# Patient Record
Sex: FEMALE | Race: WHITE | Employment: FULL TIME | URBAN - METROPOLITAN AREA
[De-identification: names, ages, dates, MRNs, and addresses within clinical notes are randomized per-mention and may not be internally consistent; named-entity substitution may affect disease eponyms.]

---

## 2018-09-19 ENCOUNTER — OP HISTORICAL/CONVERTED ENCOUNTER (OUTPATIENT)
Dept: OTHER | Age: 48
End: 2018-09-19

## 2021-05-26 ENCOUNTER — HOSPITAL ENCOUNTER (OUTPATIENT)
Dept: PHYSICAL THERAPY | Age: 51
Discharge: HOME | End: 2021-05-26
Payer: COMMERCIAL

## 2021-05-26 PROCEDURE — 97162 PT EVAL MOD COMPLEX 30 MIN: CPT

## 2021-05-26 PROCEDURE — 97110 THERAPEUTIC EXERCISES: CPT

## 2021-05-26 NOTE — PROGRESS NOTES
[address] [address] [facility] [address] [facility], [address] [place] [address]  Ph: [phone]    Fax: [phone]    Initial Evaluation/Plan of Care/Statement of Necessity for Physical Therapy Services     Patient name: Aysha Meza   : 1970  [x]  Patient  Verified Provider#: [id number]    Start of Care: 21       Onset Date: 21  Referral source: LAMBERT Linder Return visit to MD: 6 weeks     Medical/Treatment Diagnosis: Low back pain [M54.5]  Radiculopathy, lumbar region [M54.16]    Payor: Elida Gone / Plan: [employer] HMO/CHOICE PLUS/POS / Product Type: HMO /       Prior Hospitalization: see medical history     Comorbidities: asthma  Prior Level of Function: independent  Medications: Verified on Patient Summary List          Patient / Family readiness to learn indicated by: asking questions and trying to perform skills  Persons(s) to be included in education: patient (P)  Barriers to Learning/Limitations: None  Patient Self Reported Health Status: good  Rehabilitation Potential: good    In time:0230pm   Out time:0330pm Total Treatment Time (min): 60   Total Timed Codes (min): 25 1:1 Treatment Time ( only): 25   Visit #: 1     SUBJECTIVE  Pt states she has \"spondylo\". Reports history of chronic back pain and pathology since herniating two discs in early 2018. She had PT after this which seemed to help. However, after this initial injury, she has continued to have cycles of pain. Has had three other episodes of severe pain with radicular symptoms since initial injury in 2018 including one last week. Pain has never completely went away between these episodes but gets better. Pain radiates into the L hip, gluteal muscles, into thigh and foot. Pt states she also has a new bone spur on the L foot (calcaneus) but was told it was not big enough to do anything for. Also reports new onset abnormal sensation in the L 2nd toe, dorsal surface.    Pt states she is moving and has been doing a lot of work around the house which could contribute to her recent pain. Pt expressing concern because she can tell she is getting weaker and is very nervous about her back. Difficulty exercising due to fear of hurting her back further. Previous Treatment/Compliance: PT in 2018, has not been consistent about continuing HEP  PMHx/Surgical Hx: no surgeries/injections; MRI in 2018 and x-rays taken yesterday showing increased vertebral narrowing. Work Hx: sits in a chair most of the day  Living Situation: moving to new home, lives with spouse  Barriers to progress: chronicity  Substance use: no  Cognition: A & O x 4     PAIN:  Area of pain: L lower back and L leg   Pain Level (0-10 scale): 2-10/10   Things that worsen pain: standing, bending, and turning   Things that ease pain: nothing   *Pt is on a steroid pack for one week, then switches to a non-steroid for one more week. Pain described as a burning pain, sharp in the L hip and with certain movements.     Pain Level (0-10 scale) pre treatment: 2/10 Pain Level (0-10 scale) post treatment: 2/10    OBJECTIVE    25 min Therapeutic Exercise:  [x] See flow sheet : Reviewed HEP   Rationale: increase ROM and increase strength to improve the patients ability to perform ADLs/exercise with decrease pain           With   [x] TE   [] TA   [] neuro   [] other: Patient Education: [x] Review HEP    [] Progressed/Changed HEP based on:   [x] positioning   [] body mechanics   [] transfers   [] heat/ice application    [x] other: instructed in use of rolling pin and tennis ball for myofascial release of ITB and piriformis muscle; instructed in trial of prone positioning when painful     Objective/Functional Measures:   Physical Findings   Ortho:   Posture: LLE appears longer in supine, equal in long sit  Gait and Functional Mobility:  WNL no significant antalgic pattern, prefers use of UE to assist in sit to stand transfer  Palpation: increased tightness and trigger points noted in L piriformis, L ITB, L gluteals, hal lumbar paraspinals R>L with tenderness to palpation  Spine:   TRUNK ROM:   Flexion:                         [] N/A  []WNL  [x]Minimal  []Moderate  [] Major pain  Extension:                     [] N/A  []WNL  [x]Minimal  []Moderate  [] Major    Right Lateral Flexion:    [] N/A  []WNL  [x]Minimal  []Moderate  [] Major    Left Lateral Flexion:   [] N/A  []WNL  [x]Minimal  []Moderate  [] Major   Rotation to the Right:  [] N/A  []WNL  [x]Minimal  []Moderate  [] Major   Rotation to the Left:   [] N/A  []WNL  [x]Minimal  []Moderate  [] Major   Additional comments: minimal ROM deficits but painful at end range; pt states she used to be very flexible   Specific joints: *normal values in ()     Hip      AROM       PROM             MMT   R L R L R L   Flexion (120)     4 4   Extension (15)     4 4-   Abduction (40)     4 4-   Adduction (30)         IR (40)         ER (40)         Additional comments: ROM WNL Hal  Knee and ANKLE strength/ROM WNL      Mobility Assessment: PA mobs not assessed     Flexibility: Hamstring tightness 90/90: R- 11 deg from full; L- 25 deg; moderate L calf tightness      Neurological: Reflexes / Sensations: abnormal sensation L 2nd toe plantar surface, reports hypersensitivity and tingling  Special Tests: slump (+L); SLR (+L)     ASSESSMENT/Changes in Function:   Pt is a 45 yr old female presenting to outpatient PT services with diagnosis of lumbar radiculopathy. Signs and symptoms consistent with diagnosis. Pt also reporting L lateral hip pain and TTP/tightness through IT band and TTP over greater trochanter. Pt is very tight with reproduction of symptoms with palpation of L piriformis. Note core instability, L hip weakness, and muscle tightness through the LLE and B paraspinals. Pt reports fear avoidance behaviors and states she is discouraged because she feels she cannot exercise due to risk of back injury.  Pt will benefit from skilled PT services to address impairments and allow return to max level of function. Problem List/Impairments: pain affecting function, decrease ROM, decrease strength, decrease ADL/ functional abilitiies, decrease activity tolerance and decrease flexibility/ joint mobility  Patient Goal (s): ease pain and learn helpful exercises  Short Term Goals: To be accomplished in 6 treatments:  1. Pt will report compliance to a progressive HEP. 2. Pt will report decrease in max pain per VAS to 5/10. Long Term Goals: To be accomplished in 16 treatments:  1. Pt will demo full trunk ROM without pain. 2. Pt will report ability to stand to perform ADLs without aggravating back pain. 3. Pt will demo appropriate body mechanics with squatting and lifting. 4. Pt will report confidence in starting a regular exercise program.  Frequency / Duration: Patient to be seen 2 times per week for 16 treatments. Certification Period: 5/26/21 - 8/24/21  Treatment Plan may include any combination of the following: Therapeutic exercise, Therapeutic activities, Neuromuscular re-education, Physical agent/modality, Manual therapy, Patient education, Self Care training and Functional mobility training    Patient/ Caregiver education and instruction: self care and exercises    [x]  Plan of care has been reviewed with PTA. The Plan of Care is based on information from the initial evaluation. Enzo Falk, PT, DPT 5/26/2021     ________________________________________________________________________    I certify that the above Therapy Services are being furnished while the patient is under my care. I agree with the treatment plan and certify that this therapy is necessary.     [de-identified] Signature:____________________  Date:____________Time: _________

## 2021-05-28 ENCOUNTER — APPOINTMENT (OUTPATIENT)
Dept: PHYSICAL THERAPY | Age: 51
End: 2021-05-28
Payer: COMMERCIAL

## 2021-06-02 ENCOUNTER — HOSPITAL ENCOUNTER (OUTPATIENT)
Dept: PHYSICAL THERAPY | Age: 51
Discharge: HOME | End: 2021-06-02
Payer: COMMERCIAL

## 2021-06-02 PROCEDURE — 97140 MANUAL THERAPY 1/> REGIONS: CPT

## 2021-06-02 PROCEDURE — 97014 ELECTRIC STIMULATION THERAPY: CPT

## 2021-06-02 PROCEDURE — 97110 THERAPEUTIC EXERCISES: CPT

## 2021-06-02 NOTE — PROGRESS NOTES
PT DAILY TREATMENT NOTE  NON [facility]     Patient Name: Charles Lorenzo  Date:2021  : 1970  [x]  Patient  Verified  Payor: Hay Denis / Plan: [facility] HMO/CHOICE PLUS/POS / Product Type: HMO /    Treatment Area: Low back pain [M54.5]  Radiculopathy, lumbar region [M54.16]       Next MD APPT:  In time:1:10pm  Out time:2:15pm  Total Treatment Time (min): 65  Visit #: 2    SUBJECTIVE  Pain Level (0-10 scale) pre treatment: 4     Pain Level (0-10 scale) post treatment: 2  Any medication changes, allergies to medications, adverse drug reactions, diagnosis change, or new procedure performed?:   [x] No    [] Yes (see summary sheet for update)  Subjective functional status/changes:   [x] No changes reported  Pt states her L heel is hurting the most right now. Pt reports no change in back pain. A lot of pain from back into the L LE with hamstring stretch using the strap.     OBJECTIVE    Modality rationale: decrease inflammation, decrease pain and increase tissue extensibility to improve the patients ability to move around without back/L leg pain    Min Type Additional Details   15 [] Estim: [x]UnAtt   []Att       []TENS instruct                  [x]IFC  []Premod   []NMES                     []Other:  []w/US   []w/ice   [x]w/heat  Position: prone   Location: lumbosacral region     []  Ice     []  Heat  []  Ice massage Position:  Location:    []  Traction: [] Cervical       []Lumbar                       [] Prone          []Supine                       []Intermittent   []Continuous Lbs:  []w/heat  []W/heat and Estim    []  Ultrasound: []Continuous   [] Pulsed at:                           []1MHz   []3MHz Location:  W/cm2:      [x] Skin assessment post-treatment:   [x]intact  []redness- no adverse reaction   []redness  adverse reaction:   35 min Therapeutic Exercise:  [x] See flow sheet :   Rationale: increase ROM and increase strength to improve the patients ability to move around without pain   10 min Manual Therapy: Trigger point release L gluteal region    Rationale: decrease pain, increase ROM, increase tissue extensibility and decrease trigger points to improve the patients ability to move around without LBP     With   [x] TE   [] TA   [] Neuro   [] SC   [] other: Patient Education: [x] Review HEP    [] Progressed/Changed HEP based on:   [] positioning   [] body mechanics   [] transfers   [] Use of heat/ice     [] other:         Other Objective/Functional Measures: modified hamstring stretching to seated position and added towel calf stress. Time spent also educating pt on pain management tips for back and foot pains. ASSESSMENT/Changes in Function:   Pt responded well to tx with reduction in pain post session. Trigger point noted in glut med and quadratus femoris. Pt also with plantar fascia and gastroc tightness on L which is likely contributing to pains throughout the L side. Will continue to work on flexibility and core stabilization as tolerated. Patient will continue to benefit from skilled PT services to modify and progress therapeutic interventions, address ROM deficits, address strength deficits, analyze and address soft tissue restrictions, analyze and cue movement patterns and analyze and modify body mechanics/ergonomics to attain remaining goals. GOALS/Progress towards goals:  Patient Goal (s): ease pain and learn helpful exercises  Short Term Goals: To be accomplished in 6 treatments:  1. Pt will report compliance to a progressive HEP. 2. Pt will report decrease in max pain per VAS to 5/10. Long Term Goals: To be accomplished in 16 treatments:  1. Pt will demo full trunk ROM without pain. 2. Pt will report ability to stand to perform ADLs without aggravating back pain. 3. Pt will demo appropriate body mechanics with squatting and lifting.   4. Pt will report confidence in starting a regular exercise program.  PLAN  [x]  Upgrade activities as tolerated [x]  Continue plan of care  [x]  Update interventions per flow sheet       []  Discharge due to:_  []  Other:_      Helio Kirby, PT, DPT 6/2/2021

## 2021-06-04 ENCOUNTER — HOSPITAL ENCOUNTER (OUTPATIENT)
Dept: PHYSICAL THERAPY | Age: 51
Discharge: HOME | End: 2021-06-04
Payer: COMMERCIAL

## 2021-06-04 PROCEDURE — 97110 THERAPEUTIC EXERCISES: CPT

## 2021-06-04 PROCEDURE — 97014 ELECTRIC STIMULATION THERAPY: CPT

## 2021-06-04 PROCEDURE — 97140 MANUAL THERAPY 1/> REGIONS: CPT

## 2021-06-04 NOTE — PROGRESS NOTES
PT DAILY TREATMENT NOTE  [facility]     Patient Name: Gregg Barker  Date:2021  : 1970  [x]  Patient  Verified  Payor: Mary Fonseca / Plan: [facility] HMO/CHOICE PLUS/POS / Product Type: HMO /    Treatment Area: Low back pain [M54.5]  Radiculopathy, lumbar region [M54.16]       Next MD APPT:  In time:9:05am  Out time:10:05am  Total Treatment Time (min): 60  Visit #: 3    SUBJECTIVE  Pain Level (0-10 scale) pre treatment: 2     Pain Level (0-10 scale) post treatment: 1  Any medication changes, allergies to medications, adverse drug reactions, diagnosis change, or new procedure performed?:   [x] No    [] Yes (see summary sheet for update)  Subjective functional status/changes:   [x] No changes reported  Pt reports minimal LBP currently. Chief complaint L heel pain. Pt requesting for referral for the L heel. Pt states she thinks the last tx helped with the back pain.       OBJECTIVE    Modality rationale: decrease inflammation, decrease pain and increase tissue extensibility to improve the patients ability to move around without back/L leg pain    Min Type Additional Details   15 [] Estim: [x]UnAtt   []Att       []TENS instruct                  [x]IFC  []Premod   []NMES                     []Other:  []w/US   []w/ice   [x]w/heat  Position: prone   Location: lumbosacral region     []  Ice     []  Heat  []  Ice massage Position:  Location:    []  Traction: [] Cervical       []Lumbar                       [] Prone          []Supine                       []Intermittent   []Continuous Lbs:  []w/heat  []W/heat and Estim    []  Ultrasound: []Continuous   [] Pulsed at:                           []1MHz   []3MHz Location:  W/cm2:      [x] Skin assessment post-treatment:   [x]intact  []redness- no adverse reaction   []redness  adverse reaction:   35 min Therapeutic Exercise:  [x] See flow sheet :   Rationale: increase ROM and increase strength to improve the patients ability to move around without pain   10 min Manual Therapy: Trigger point release L gluteal region    Rationale: decrease pain, increase ROM, increase tissue extensibility and decrease trigger points to improve the patients ability to move around without LBP     With   [x] TE   [] TA   [] Neuro   [] SC   [] other: Patient Education: [x] Review HEP    [] Progressed/Changed HEP based on:   [] positioning   [] body mechanics   [] transfers   [] Use of heat/ice     [] other:         Other Objective/Functional Measures: continued previous exercises - initiated abdominal bracing. ASSESSMENT/Changes in Function:   Pt with weakness abdominal muscles due to h.o. small bowel resection. Pt also reporting pelvic floor weakness. Will continue to progress core stabilization as tolerated. Noted trigger points along the quadratus femoris and IT band. Will request referral for L heel pain as this is her chief complaint as this time. Patient will continue to benefit from skilled PT services to modify and progress therapeutic interventions, address ROM deficits, address strength deficits, analyze and address soft tissue restrictions, analyze and cue movement patterns and analyze and modify body mechanics/ergonomics to attain remaining goals. GOALS/Progress towards goals:  Patient Goal (s): ease pain and learn helpful exercises  Short Term Goals: To be accomplished in 6 treatments:  1. Pt will report compliance to a progressive HEP. 2. Pt will report decrease in max pain per VAS to 5/10. Long Term Goals: To be accomplished in 16 treatments:  1. Pt will demo full trunk ROM without pain. 2. Pt will report ability to stand to perform ADLs without aggravating back pain. 3. Pt will demo appropriate body mechanics with squatting and lifting.   4. Pt will report confidence in starting a regular exercise program.  PLAN  [x]  Upgrade activities as tolerated     [x]  Continue plan of care  [x]  Update interventions per flow sheet       [] Discharge due to:_  [x]  Other:_  Request sent for FLACA Lay PT, DPT 6/4/2021

## 2021-06-07 ENCOUNTER — HOSPITAL ENCOUNTER (OUTPATIENT)
Dept: PHYSICAL THERAPY | Age: 51
Discharge: HOME | End: 2021-06-07
Payer: COMMERCIAL

## 2021-06-07 PROCEDURE — 97110 THERAPEUTIC EXERCISES: CPT

## 2021-06-07 PROCEDURE — 97014 ELECTRIC STIMULATION THERAPY: CPT

## 2021-06-07 NOTE — PROGRESS NOTES
PT DAILY TREATMENT NOTE  [facility]     Patient Name: Leonela Deluca  Date:2021  : 1970  [x]  Patient  Verified  Payor: Zeus Day / Plan: [facility] HMO/CHOICE PLUS/POS / Product Type: HMO /    Treatment Area: Low back pain [M54.5]  Radiculopathy, lumbar region [M54.16]       Next MD APPT:  In time:9:10am  Out time: 10:05am  Total Treatment Time (min): 55  Visit #:4    SUBJECTIVE  Pain Level (0-10 scale) pre treatment: 0     Pain Level (0-10 scale) post treatment: 0  Any medication changes, allergies to medications, adverse drug reactions, diagnosis change, or new procedure performed?:   [x] No    [] Yes (see summary sheet for update)  Subjective functional status/changes:   [x] No changes reported  Pt reports she was swimming all weekend and is feeling pretty good this morning. No pain in the back or heel this morning.       OBJECTIVE    Modality rationale: decrease inflammation, decrease pain and increase tissue extensibility to improve the patients ability to move around without back/L leg pain    Min Type Additional Details   15 [] Estim: [x]UnAtt   []Att       []TENS instruct                  [x]IFC  []Premod   []NMES                     []Other:  []w/US   []w/ice   [x]w/heat  Position: R sidelying   Location: L gluteal     []  Ice     []  Heat  []  Ice massage Position:  Location:    []  Traction: [] Cervical       []Lumbar                       [] Prone          []Supine                       []Intermittent   []Continuous Lbs:  []w/heat  []W/heat and Estim    []  Ultrasound: []Continuous   [] Pulsed at:                           []1MHz   []3MHz Location:  W/cm2:      [x] Skin assessment post-treatment:   [x]intact  []redness- no adverse reaction   []redness  adverse reaction:   40 min Therapeutic Exercise:  [x] See flow sheet :   Rationale: increase ROM and increase strength to improve the patients ability to move around without pain       With   [x] TE   [] TA   [] Neuro   [] SC   [] other: Patient Education: [x] Review HEP    [] Progressed/Changed HEP based on:   [] positioning   [] body mechanics   [] transfers   [] Use of heat/ice     [] other:         Other Objective/Functional Measures: Progressed core stabilization in hooklying position     ASSESSMENT/Changes in Function:   No pain pre or post tx this session. Pt is progressing well at this time. Responded well to swimming over the weekend. Still noting some trigger points along the L IT band and gluteal region at this time. Will continue to progress as tolerated. .    Patient will continue to benefit from skilled PT services to modify and progress therapeutic interventions, address ROM deficits, address strength deficits, analyze and address soft tissue restrictions, analyze and cue movement patterns and analyze and modify body mechanics/ergonomics to attain remaining goals. GOALS/Progress towards goals:  Patient Goal (s): ease pain and learn helpful exercises  Short Term Goals: To be accomplished in 6 treatments:  1. Pt will report compliance to a progressive HEP. 2. Pt will report decrease in max pain per VAS to 5/10. Long Term Goals: To be accomplished in 16 treatments:  1. Pt will demo full trunk ROM without pain. 2. Pt will report ability to stand to perform ADLs without aggravating back pain. 3. Pt will demo appropriate body mechanics with squatting and lifting.   4. Pt will report confidence in starting a regular exercise program.  PLAN  [x]  Upgrade activities as tolerated     [x]  Continue plan of care  [x]  Update interventions per flow sheet       []  Discharge due to:_  [x]  Other:_  Request sent for FLACA Morales, PT, DPT 6/7/2021

## 2021-06-11 ENCOUNTER — APPOINTMENT (OUTPATIENT)
Dept: PHYSICAL THERAPY | Age: 51
End: 2021-06-11
Payer: COMMERCIAL

## 2021-06-14 ENCOUNTER — HOSPITAL ENCOUNTER (OUTPATIENT)
Dept: PHYSICAL THERAPY | Age: 51
Discharge: HOME | End: 2021-06-14
Payer: COMMERCIAL

## 2021-06-14 PROCEDURE — 97014 ELECTRIC STIMULATION THERAPY: CPT

## 2021-06-14 PROCEDURE — 97110 THERAPEUTIC EXERCISES: CPT

## 2021-06-14 NOTE — PROGRESS NOTES
PT DAILY TREATMENT NOTE  [facility]     Patient Name: Arun Purchase  Date:2021  : 1970  [x]  Patient  Verified  Payor: Michael Ernandez / Plan: [facility] HMO/CHOICE PLUS/POS / Product Type: HMO /    Treatment Area: Low back pain [M54.5]  Radiculopathy, lumbar region [M54.16]       Next MD APPT:  In time:1:08pm  Out time: 2:03pm  Total Treatment Time (min): 55  Visit #:5    SUBJECTIVE  Pain Level (0-10 scale) pre treatment: 4     Pain Level (0-10 scale) post treatment: 0  Any medication changes, allergies to medications, adverse drug reactions, diagnosis change, or new procedure performed?:   [x] No    [] Yes (see summary sheet for update)  Subjective functional status/changes:   [x] No changes reported      OBJECTIVE    Modality rationale: decrease inflammation, decrease pain and increase tissue extensibility to improve the patients ability to move around without back/L leg pain    Min Type Additional Details   15 [] Estim: [x]UnAtt   []Att       []TENS instruct                  [x]IFC  []Premod   []NMES                     []Other:  []w/US   []w/ice   [x]w/heat  Position: R sidelying   Location: L gluteal     []  Ice     []  Heat  []  Ice massage Position:  Location:    []  Traction: [] Cervical       []Lumbar                       [] Prone          []Supine                       []Intermittent   []Continuous Lbs:  []w/heat  []W/heat and Estim    []  Ultrasound: []Continuous   [] Pulsed at:                           []1MHz   []3MHz Location:  W/cm2:      [x] Skin assessment post-treatment:   [x]intact  []redness- no adverse reaction   []redness  adverse reaction:   40 min Therapeutic Exercise:  [x] See flow sheet :   Rationale: increase ROM and increase strength to improve the patients ability to move around without pain       With   [x] TE   [] TA   [] Neuro   [] SC   [] other: Patient Education: [x] Review HEP    [] Progressed/Changed HEP based on:   [] positioning   [] body mechanics   [] transfers   [] Use of heat/ice     [] other:         Other Objective/Functional Measures: Continued previous exercises    ASSESSMENT/Changes in Function:   Pt with some pain upon arrival that subsided with exercise. Pt is progressing well at this time. Will continue current POC. Patient will continue to benefit from skilled PT services to modify and progress therapeutic interventions, address ROM deficits, address strength deficits, analyze and address soft tissue restrictions, analyze and cue movement patterns and analyze and modify body mechanics/ergonomics to attain remaining goals. GOALS/Progress towards goals:  Patient Goal (s): ease pain and learn helpful exercises  Short Term Goals: To be accomplished in 6 treatments:  1. Pt will report compliance to a progressive HEP. [x] Met [] Not met [] Partially met   - 6/14  2. Pt will report decrease in max pain per VAS to 5/10. [] Met [] Not met [] Partially met  - 6/14    Long Term Goals: To be accomplished in 16 treatments:  1. Pt will demo full trunk ROM without pain. 2. Pt will report ability to stand to perform ADLs without aggravating back pain. 3. Pt will demo appropriate body mechanics with squatting and lifting.   4. Pt will report confidence in starting a regular exercise program.  PLAN  [x]  Upgrade activities as tolerated     [x]  Continue plan of care  [x]  Update interventions per flow sheet       []  Discharge due to:_  [x]  Other:_  Request sent for L heel Mya Hughes Burkitt, PT, DPT 6/14/2021

## 2021-09-01 NOTE — PROGRESS NOTES
[address] [address] [facility] [address] [facility] [address] [place] [address]  Ph: [phone]  Fax: [phone]    Discharge Summary 2-15    Patient name: Gregg Barker  : 1970  Provider#: [id number]  Referral source: LAMBERT Simons      Medical/Treatment Diagnosis: Low back pain [M54.5]  Radiculopathy, lumbar region [M54.16]     Prior Hospitalization: see medical history     Comorbidities: See Plan of Care  Prior Level of Function: See Plan of Care  Medications: Verified on Patient Summary List  Start of Care: 21    Onset Date: 21    Visits from Start of Care: 5  Missed Visits: 0  Certification Period : 21 to 21    Assessment/Summary of care/GOALS:   Pt completed 5 visits but stopped coming without reason. Pt was progressing at that time with less back pain. Her chief complaint was L heel pain. We will be closing out her chart at this time due to expiration of POC. If she wishes to return at a later date, she will need a new referral.  Thank you!     RECOMMENDATIONS:  [x]Discontinue therapy: []Patient has reached or is progressing toward set goals and is independent with HEP     [x]Patient is non-compliant or has abdicated     []Due to lack of appreciable progress towards set goals     []Other  [address], PT, DPT 2021

## 2022-07-05 ENCOUNTER — APPOINTMENT (OUTPATIENT)
Dept: CT IMAGING | Age: 52
End: 2022-07-05
Attending: PHYSICIAN ASSISTANT
Payer: COMMERCIAL

## 2022-07-05 ENCOUNTER — HOSPITAL ENCOUNTER (EMERGENCY)
Age: 52
Discharge: HOME | End: 2022-07-05
Attending: EMERGENCY MEDICINE
Payer: COMMERCIAL

## 2022-07-05 VITALS
HEART RATE: 82 BPM | WEIGHT: 168 LBS | OXYGEN SATURATION: 99 % | HEIGHT: 64 IN | SYSTOLIC BLOOD PRESSURE: 137 MMHG | RESPIRATION RATE: 18 BRPM | DIASTOLIC BLOOD PRESSURE: 98 MMHG | BODY MASS INDEX: 28.68 KG/M2 | TEMPERATURE: 98.4 F

## 2022-07-05 DIAGNOSIS — E87.6 HYPOKALEMIA: ICD-10-CM

## 2022-07-05 DIAGNOSIS — G44.209 TENSION HEADACHE: Primary | ICD-10-CM

## 2022-07-05 LAB
ANION GAP SERPL CALC-SCNC: 9 MMOL/L (ref 5–15)
BASOPHILS # BLD: 0.1 K/UL (ref 0–0.1)
BASOPHILS NFR BLD: 1 % (ref 0–1)
BUN SERPL-MCNC: 9 MG/DL (ref 6–20)
BUN/CREAT SERPL: 10 (ref 12–20)
CA-I BLD-MCNC: 9.2 MG/DL (ref 8.5–10.1)
CHLORIDE SERPL-SCNC: 100 MMOL/L (ref 97–108)
CO2 SERPL-SCNC: 28 MMOL/L (ref 21–32)
CREAT SERPL-MCNC: 0.91 MG/DL (ref 0.55–1.02)
DIFFERENTIAL METHOD BLD: NORMAL
EOSINOPHIL # BLD: 0.1 K/UL (ref 0–0.4)
EOSINOPHIL NFR BLD: 2 % (ref 0–7)
ERYTHROCYTE [DISTWIDTH] IN BLOOD BY AUTOMATED COUNT: 14.4 % (ref 11.5–14.5)
GLUCOSE SERPL-MCNC: 109 MG/DL (ref 65–100)
HCT VFR BLD AUTO: 39.8 % (ref 35–47)
HGB BLD-MCNC: 13.1 G/DL (ref 11.5–16)
IMM GRANULOCYTES # BLD AUTO: 0 K/UL (ref 0–0.04)
IMM GRANULOCYTES NFR BLD AUTO: 0 % (ref 0–0.5)
LYMPHOCYTES # BLD: 2.2 K/UL (ref 0.8–3.5)
LYMPHOCYTES NFR BLD: 38 % (ref 12–49)
MCH RBC QN AUTO: 28.9 PG (ref 26–34)
MCHC RBC AUTO-ENTMCNC: 32.9 G/DL (ref 30–36.5)
MCV RBC AUTO: 87.9 FL (ref 80–99)
MONOCYTES # BLD: 0.5 K/UL (ref 0–1)
MONOCYTES NFR BLD: 9 % (ref 5–13)
NEUTS SEG # BLD: 3 K/UL (ref 1.8–8)
NEUTS SEG NFR BLD: 50 % (ref 32–75)
PLATELET # BLD AUTO: 203 K/UL (ref 150–400)
PMV BLD AUTO: 9.7 FL (ref 8.9–12.9)
POTASSIUM SERPL-SCNC: 3.2 MMOL/L (ref 3.5–5.1)
RBC # BLD AUTO: 4.53 M/UL (ref 3.8–5.2)
SODIUM SERPL-SCNC: 137 MMOL/L (ref 136–145)
WBC # BLD AUTO: 5.8 K/UL (ref 3.6–11)

## 2022-07-05 PROCEDURE — 85025 COMPLETE CBC W/AUTO DIFF WBC: CPT

## 2022-07-05 PROCEDURE — 74011250636 HC RX REV CODE- 250/636: Performed by: PHYSICIAN ASSISTANT

## 2022-07-05 PROCEDURE — 74011250637 HC RX REV CODE- 250/637: Performed by: PHYSICIAN ASSISTANT

## 2022-07-05 PROCEDURE — 99284 EMERGENCY DEPT VISIT MOD MDM: CPT

## 2022-07-05 PROCEDURE — 36415 COLL VENOUS BLD VENIPUNCTURE: CPT

## 2022-07-05 PROCEDURE — 96361 HYDRATE IV INFUSION ADD-ON: CPT

## 2022-07-05 PROCEDURE — 80048 BASIC METABOLIC PNL TOTAL CA: CPT

## 2022-07-05 PROCEDURE — 96365 THER/PROPH/DIAG IV INF INIT: CPT

## 2022-07-05 PROCEDURE — 96375 TX/PRO/DX INJ NEW DRUG ADDON: CPT

## 2022-07-05 PROCEDURE — 70450 CT HEAD/BRAIN W/O DYE: CPT

## 2022-07-05 RX ORDER — DIAZEPAM 5 MG/1
5 TABLET ORAL ONCE
Status: COMPLETED | OUTPATIENT
Start: 2022-07-05 | End: 2022-07-05

## 2022-07-05 RX ORDER — KETOROLAC TROMETHAMINE 10 MG/1
10 TABLET, FILM COATED ORAL
Qty: 18 TABLET | Refills: 0 | Status: SHIPPED | OUTPATIENT
Start: 2022-07-05

## 2022-07-05 RX ORDER — MAGNESIUM SULFATE HEPTAHYDRATE 40 MG/ML
2 INJECTION, SOLUTION INTRAVENOUS ONCE
Status: COMPLETED | OUTPATIENT
Start: 2022-07-05 | End: 2022-07-05

## 2022-07-05 RX ORDER — POTASSIUM CHLORIDE 750 MG/1
40 TABLET, FILM COATED, EXTENDED RELEASE ORAL
Status: COMPLETED | OUTPATIENT
Start: 2022-07-05 | End: 2022-07-05

## 2022-07-05 RX ORDER — ONDANSETRON 2 MG/ML
4 INJECTION INTRAMUSCULAR; INTRAVENOUS
Status: COMPLETED | OUTPATIENT
Start: 2022-07-05 | End: 2022-07-05

## 2022-07-05 RX ORDER — KETOROLAC TROMETHAMINE 15 MG/ML
15 INJECTION, SOLUTION INTRAMUSCULAR; INTRAVENOUS
Status: COMPLETED | OUTPATIENT
Start: 2022-07-05 | End: 2022-07-05

## 2022-07-05 RX ORDER — DIAZEPAM 2 MG/1
2 TABLET ORAL ONCE
Status: DISCONTINUED | OUTPATIENT
Start: 2022-07-05 | End: 2022-07-05

## 2022-07-05 RX ORDER — DIAZEPAM 5 MG/1
5 TABLET ORAL
Qty: 15 TABLET | Refills: 0 | Status: SHIPPED | OUTPATIENT
Start: 2022-07-05

## 2022-07-05 RX ORDER — BUTALBITAL, ACETAMINOPHEN AND CAFFEINE 50; 325; 40 MG/1; MG/1; MG/1
TABLET ORAL
COMMUNITY
Start: 2022-07-01

## 2022-07-05 RX ORDER — DEXAMETHASONE SODIUM PHOSPHATE 10 MG/ML
10 INJECTION INTRAMUSCULAR; INTRAVENOUS ONCE
Status: COMPLETED | OUTPATIENT
Start: 2022-07-05 | End: 2022-07-05

## 2022-07-05 RX ORDER — BACLOFEN 10 MG/1
TABLET ORAL
COMMUNITY
Start: 2022-06-30

## 2022-07-05 RX ORDER — HYDROXYCHLOROQUINE SULFATE 200 MG/1
200 TABLET, FILM COATED ORAL DAILY
COMMUNITY
Start: 2022-06-30

## 2022-07-05 RX ADMIN — KETOROLAC TROMETHAMINE 15 MG: 15 INJECTION, SOLUTION INTRAMUSCULAR; INTRAVENOUS at 16:15

## 2022-07-05 RX ADMIN — DIAZEPAM 5 MG: 5 TABLET ORAL at 16:47

## 2022-07-05 RX ADMIN — DEXAMETHASONE SODIUM PHOSPHATE 10 MG: 10 INJECTION, SOLUTION INTRAMUSCULAR; INTRAVENOUS at 16:47

## 2022-07-05 RX ADMIN — ONDANSETRON 4 MG: 2 INJECTION INTRAMUSCULAR; INTRAVENOUS at 16:15

## 2022-07-05 RX ADMIN — SODIUM CHLORIDE 1000 ML: 9 INJECTION, SOLUTION INTRAVENOUS at 16:18

## 2022-07-05 RX ADMIN — POTASSIUM CHLORIDE 40 MEQ: 750 TABLET, FILM COATED, EXTENDED RELEASE ORAL at 17:42

## 2022-07-05 RX ADMIN — MAGNESIUM SULFATE HEPTAHYDRATE 2 G: 40 INJECTION, SOLUTION INTRAVENOUS at 16:50

## 2022-07-05 NOTE — ED PROVIDER NOTES
EMERGENCY DEPARTMENT HISTORY AND PHYSICAL EXAM      Date: 7/5/2022  Patient Name: Alexandr Blackwell    History of Presenting Illness     Chief Complaint   Patient presents with    Migraine    Nausea       History Provided By: Patient, significant other    HPI: Alexandr Blackwell, 47 y.o. female with a past medical history significant migraine headaches, Crohn's disease, asthma presents to the ED with cc of left-sided headache described as tightness, and radiating from the left side of her neck onset 5 days ago, intermittently worse, never resolved. These headaches have been present for some time, coming and going. Patient reports severe muscle tension and spasm of the neck muscles on the left side which has been ongoing for a number of weeks-months. She is currently taking fioricet for the headaches and baclofen for the muscle spasms. She reports no relief with these medications over the last few days. She was also prescribed plaquenil for a potential rheumatologic condition (has upcoming appointment with rheumatologist - has not started the medication). She has also tried chiropractor and massage therapy. Associated sxs include severe nausea and fatigue. She denies head injury, chest pain, fever, diarrhea, abdominal pain, SOB, ear pain, tinnitus, trouble swallowing. There are no other complaints, changes, or physical findings at this time. PCP: Edilberto Crump MD    No current facility-administered medications on file prior to encounter. Current Outpatient Medications on File Prior to Encounter   Medication Sig Dispense Refill    butalbital-acetaminophen-caffeine (FIORICET, ESGIC) -40 mg per tablet TAKE ONE TABLET BY MOUTH THREE TIMES DAILY      baclofen (LIORESAL) 10 mg tablet TAKE ONE TABLET BY MOUTH twice daily AS NEEDED      hydrOXYchloroQUINE (PLAQUENIL) 200 mg tablet Take 200 mg by mouth daily.          Past History     Past Medical History:  Past Medical History:   Diagnosis Date    Abnormal EKG     WPW Pattern    Asthma     Crohn disease ([facility] [address] 1992    surgery 1994    Migraines        Past Surgical History:  Past Surgical History:   Procedure Laterality Date    HX PARTIAL HYSTERECTOMY  2007    HX SMALL BOWEL RESECTION  9/18/1994       Family History:  Family History   Problem Relation Age of Onset    Diabetes Mother     Asthma Mother     Arrhythmia Mother     Hypertension Father     Cancer Brother     Heart Attack Maternal Uncle        Social History:  Social History     Tobacco Use    Smoking status: Never Smoker    Smokeless tobacco: Never Used   Substance Use Topics    Alcohol use: Yes     Comment: glass of wine daily    Drug use: No       Allergies: Allergies   Allergen Reactions    Tetracycline Nausea and Vomiting         Review of Systems   Review of Systems   Constitutional: Positive for fatigue. Negative for activity change, chills and fever. HENT: Negative for congestion, ear pain, rhinorrhea, sneezing and sore throat. Eyes: Negative for pain and visual disturbance. Respiratory: Negative for cough and shortness of breath. Cardiovascular: Negative for chest pain. Gastrointestinal: Positive for nausea. Negative for abdominal pain, diarrhea and vomiting. Genitourinary: Negative for dysuria and hematuria. Musculoskeletal: Positive for myalgias, neck pain and neck stiffness. Negative for gait problem. Skin: Negative for rash. Neurological: Positive for headaches. Negative for syncope, speech difficulty, weakness and numbness. Psychiatric/Behavioral: The patient is not nervous/anxious. All other systems reviewed and are negative. Physical Exam   Physical Exam  Vitals and nursing note reviewed. Constitutional:       General: She is not in acute distress. Appearance: Normal appearance. She is not toxic-appearing. Comments: Appears uncomfortable   HENT:      Head: Normocephalic and atraumatic.       Right Ear: Tympanic membrane normal.      Left Ear: Tympanic membrane normal.      Nose: Nose normal.      Mouth/Throat:      Mouth: Mucous membranes are moist.      Pharynx: Oropharynx is clear. Eyes:      Extraocular Movements: Extraocular movements intact. Conjunctiva/sclera: Conjunctivae normal.      Pupils: Pupils are equal, round, and reactive to light. Neck:      Trachea: Trachea normal.     Cardiovascular:      Rate and Rhythm: Normal rate. Pulses: Normal pulses. Heart sounds: Normal heart sounds. Pulmonary:      Effort: Pulmonary effort is normal. No respiratory distress. Breath sounds: Normal breath sounds. Abdominal:      General: Bowel sounds are normal.      Palpations: Abdomen is soft. Tenderness: There is no abdominal tenderness. Musculoskeletal:         General: No deformity or signs of injury. Normal range of motion. Cervical back: Normal range of motion. Pain with movement and muscular tenderness (point tenderness) present. Lymphadenopathy:      Cervical: No cervical adenopathy. Skin:     General: Skin is warm and dry. Capillary Refill: Capillary refill takes less than 2 seconds. Findings: No rash. Neurological:      General: No focal deficit present. Mental Status: She is alert and oriented to person, place, and time. GCS: GCS eye subscore is 4. GCS verbal subscore is 5. GCS motor subscore is 6. Cranial Nerves: No cranial nerve deficit, dysarthria or facial asymmetry. Sensory: Sensation is intact. Motor: Motor function is intact. Coordination: Coordination is intact. Gait: Gait is intact.    Psychiatric:         Mood and Affect: Mood normal.         Diagnostic Study Results     Labs -     Recent Results (from the past 200 hour(s))   CBC WITH AUTOMATED DIFF    Collection Time: 07/05/22  4:20 PM   Result Value Ref Range    WBC 5.8 3.6 - 11.0 K/uL    RBC 4.53 3.80 - 5.20 M/uL    HGB 13.1 11.5 - 16.0 g/dL    HCT 39.8 35.0 - 47.0 %    MCV 87.9 80.0 - 99.0 FL MCH 28.9 26.0 - 34.0 PG    MCHC 32.9 30.0 - 36.5 g/dL    RDW 14.4 11.5 - 14.5 %    PLATELET 170 968 - 958 K/uL    MPV 9.7 8.9 - 12.9 FL    NEUTROPHILS 50 32 - 75 %    LYMPHOCYTES 38 12 - 49 %    MONOCYTES 9 5 - 13 %    EOSINOPHILS 2 0 - 7 %    BASOPHILS 1 0 - 1 %    IMMATURE GRANULOCYTES 0 0.0 - 0.5 %    ABS. NEUTROPHILS 3.0 1.8 - 8.0 K/UL    ABS. LYMPHOCYTES 2.2 0.8 - 3.5 K/UL    ABS. MONOCYTES 0.5 0.0 - 1.0 K/UL    ABS. EOSINOPHILS 0.1 0.0 - 0.4 K/UL    ABS. BASOPHILS 0.1 0.0 - 0.1 K/UL    ABS. IMM. GRANS. 0.0 0.00 - 0.04 K/UL    DF AUTOMATED     METABOLIC PANEL, BASIC    Collection Time: 07/05/22  4:20 PM   Result Value Ref Range    Sodium 137 136 - 145 mmol/L    Potassium 3.2 (L) 3.5 - 5.1 mmol/L    Chloride 100 97 - 108 mmol/L    CO2 28 21 - 32 mmol/L    Anion gap 9 5 - 15 mmol/L    Glucose 109 (H) 65 - 100 mg/dL    BUN 9 6 - 20 mg/dL    Creatinine 0.91 0.55 - 1.02 mg/dL    BUN/Creatinine ratio 10 (L) 12 - 20      GFR est AA >60 >60 ml/min/1.73m2    GFR est non-AA >60 >60 ml/min/1.73m2    Calcium 9.2 8.5 - 10.1 mg/dL       Radiologic Studies -   No results found for this or any previous visit. CT Results  (Last 48 hours)    None        CT Results (most recent):  Results from Hospital Encounter encounter on 07/05/22    CT HEAD WO CONT    Narrative  EXAM:  CT HEAD WO CONT    INDICATION: Headache    COMPARISON: None    TECHNIQUE: Noncontrast head CT. Coronal and sagittal reformats. CT dose  reduction was achieved through use of a standardized protocol tailored for this  examination and automatic exposure control for dose modulation. FINDINGS: The ventricles and sulci are age-appropriate without hydrocephalus. There is no mass effect or midline shift. There is no intracranial hemorrhage or  extra-axial fluid collection. There is no abnormal parenchymal attenuation. The  gray-white matter differentiation is maintained. The basal cisterns are patent. The osseous structures are intact.  The visualized paranasal sinuses and mastoid  air cells are clear. Impression  No acute intracranial abnormality. Medical Decision Making   I am the first provider for this patient. I reviewed the vital signs, available nursing notes, past medical history, past surgical history, family history and social history. Vital Signs-Reviewed the patient's vital signs. Wt Readings from Last 3 Encounters:   07/05/22 76.2 kg (168 lb)   07/18/12 85.3 kg (188 lb)     Temp Readings from Last 3 Encounters:   07/05/22 98.4 °F (36.9 °C)     BP Readings from Last 3 Encounters:   07/05/22 (!) 137/98     Pulse Readings from Last 3 Encounters:   07/05/22 82       No data found. Records Reviewed: Nursing Notes, Old Medical Records, Previous Radiology Studies and Previous Laboratory Studies    Provider Notes (Medical Decision Making):     MDM  Number of Diagnoses or Management Options  Hypokalemia  Tension headache  Diagnosis management comments: 65-year-old female is presenting for left-sided headache with associated left-sided cervical muscle strain/spasm/point tenderness. CT of her head is without any acute intracranial process, CT was obtained to rule out subarachnoid hemorrhage as patient's headache is severe, and difficult to treat with over-the-counter medications. Her lab work-up was significant for potassium of 3.2, no leukocytosis. She is afebrile, no meningeal signs. I suspect tension headache. She feels better after IV Toradol, fluids, and Valium for muscle relaxation. She is tolerating p.o. She has a follow-up appointment with rheumatology as well as primary care. She is being worked up for some sort of myositis rheumatologic condition. She does not have a clear diagnosis at this time. We will send Valium to her pharmacy for muscle spasm relief as the baclofen is not helping, and Toradol for anti-inflammatory relief. She voices understanding of the discharge plan, potassium supplemented in the ER.        Amount and/or Complexity of Data Reviewed  Clinical lab tests: ordered and reviewed  Tests in the radiology section of CPT®: ordered and reviewed  Review and summarize past medical records: yes        ED Course:   Initial assessment performed. The patients presenting problems have been discussed, and they are in agreement with the care plan formulated and outlined with them. I have encouraged them to ask questions as they arise throughout their visit. ED Course as of 07/13/22 1944 Tue Jul 05, 2022 1726 5:27 PM  Headache is improved, patient is feeling better, she is resting comfortably, tolerating p.o. [EC]      ED Course User Index  [EC] Zoe Villa PA-C         PROCEDURES    Procedures       Disposition     Disposition: DC- Adult Discharges: All of the diagnostic tests were reviewed and questions answered. Diagnosis, care plan and treatment options were discussed. The patient understands the instructions and will follow up as directed. The patients results have been reviewed with them. They have been counseled regarding their diagnosis. The patient verbally convey understanding and agreement of the signs, symptoms, diagnosis, treatment and prognosis and additionally agrees to follow up as recommended with their PCP in 24 - 48 hours. They also agree with the care-plan and convey that all of their questions have been answered. I have also put together some discharge instructions for them that include: 1) educational information regarding their diagnosis, 2) how to care for their diagnosis at home, as well a 3) list of reasons why they would want to return to the ED prior to their follow-up appointment, should their condition change. Discharged    DISCHARGE PLAN:  1.    Current Discharge Medication List      CONTINUE these medications which have NOT CHANGED    Details   butalbital-acetaminophen-caffeine (FIORICET, ESGIC) -40 mg per tablet TAKE ONE TABLET BY MOUTH THREE TIMES DAILY      baclofen (LIORESAL) 10 mg tablet TAKE ONE TABLET BY MOUTH twice daily AS NEEDED      hydrOXYchloroQUINE (PLAQUENIL) 200 mg tablet Take 200 mg by mouth daily. 2.   Follow-up Information     Follow up With Specialties Details Why Contact Info    Gavi Allen MD Internal Medicine Physician Schedule an appointment as soon as possible for a visit  for follow up from ER visit [address] [address]  [phone]      [facility] Emergency Medicine  As needed, If symptoms worsen [address]  [phone]        3. Return to ED if worse   4. Discharge Medication List as of 7/5/2022  6:11 PM      START taking these medications    Details   diazePAM (Valium) 5 mg tablet Take 1 Tablet by mouth every eight (8) hours as needed for Muscle Spasm(s). Max Daily Amount: 15 mg., Normal, Disp-15 Tablet, R-0      ketorolac (TORADOL) 10 mg tablet Take 1 Tablet by mouth every six (6) hours as needed for Pain., Normal, Disp-18 Tablet, R-0         CONTINUE these medications which have NOT CHANGED    Details   butalbital-acetaminophen-caffeine (FIORICET, ESGIC) -40 mg per tablet TAKE ONE TABLET BY MOUTH THREE TIMES DAILY, Historical Med      baclofen (LIORESAL) 10 mg tablet TAKE ONE TABLET BY MOUTH twice daily AS NEEDED, Historical Med      hydrOXYchloroQUINE (PLAQUENIL) 200 mg tablet Take 200 mg by mouth daily. , Historical Med             Diagnosis     Clinical Impression:   1. Tension headache    2.  Hypokalemia

## 2022-08-02 ENCOUNTER — TELEPHONE (OUTPATIENT)
Dept: NEUROLOGY | Age: 52
End: 2022-08-02

## 2022-09-07 ENCOUNTER — OFFICE VISIT (OUTPATIENT)
Dept: NEUROLOGY | Age: 52
End: 2022-09-07
Payer: COMMERCIAL

## 2022-09-07 DIAGNOSIS — G56.03 BILATERAL CARPAL TUNNEL SYNDROME: Primary | ICD-10-CM

## 2022-09-07 PROCEDURE — 95911 NRV CNDJ TEST 9-10 STUDIES: CPT | Performed by: PSYCHIATRY & NEUROLOGY

## 2022-09-07 PROCEDURE — 95886 MUSC TEST DONE W/N TEST COMP: CPT | Performed by: PSYCHIATRY & NEUROLOGY

## 2022-09-07 NOTE — PROCEDURES
EMG/ NCS Report  DRUG REHABILITATION  - DAY ONE [facility]IDENCE  [facility]  [facility], [address] [place] [address] [address]   Ph: [phone]   FAX: [phone]/ [phone]    Test Date:  2022    Patient: Devonte Zuleta : 1970 Physician: Romeo Wellington MD   ID#: [id number] SEX: Female Ref. Phys: David Tim MD   Tech: Sherly Rivera    Patient History / Exam:  Patient complaining of bilateral neck and shoulder spasm and intermittent hand numbness. Assess for neuropathy vs radiculopathy. EMG & NCV Findings:  Sensory and motor nerve conduction studies (as indicated in the tables) were within reference of normal except for slight prolongation bilateral median sensory peak latencies. All F Wave latencies were within normal limits. All F Wave left vs. right side latency differences were within normal limits. Disposable concentric needle EMG (as indicated in the table) showed no evidence of electrical instability. Impressions: This study is mildly abnormal. There is electrodiagnostic evidence of an early bilateral distal median sensory neuropathy at the wrist as seen in carpal tunnel syndrome. There is no evidence of a generalized neuropathy, myopathy or significant cervical radiculopathy at this time. Thank you for the consult.      Romeo Wellington MD    Nerve Conduction Studies  Anti Sensory Summary Table     Stim Site NR Peak (ms) Norm Peak (ms) P-T Amp (µV) Norm P-T Amp Site1 Site2 Dist (cm)   Left Median Anti Sensory (2nd Digit)  23.6 °C   Wrist    4.1 <4 37.1 >13 Wrist 2nd Digit 14.0   Right Median Anti Sensory (2nd Digit)  31.6 °C   Wrist    4.1 <4 43.5 >13 Wrist 2nd Digit 14.0   Left Radial Anti Sensory (Base 1st Digit)  22.9 °C   Wrist    2.2 <2.8 42.7 >11 Wrist Base 1st Digit 10.0   Right Radial Anti Sensory (Base 1st Digit)  32 °C   Wrist    1.9 <2.8 54.2 >11 Wrist Base 1st Digit 10.0   Left Ulnar Anti Sensory (5th Digit)  23 °C   Wrist    3.3 <4.0 23.4 >9 Wrist 5th Digit 14.0   Right Ulnar Anti Sensory (5th Digit)  32 °C   Wrist    3.1 <4.0 37.4 >9 Wrist 5th Digit 14.0   B Elbow    3.1  31.4  B Elbow Wrist 0.0     Motor Summary Table     Stim Site NR Onset (ms) Norm Onset (ms) O-P Amp (mV) Norm O-P Amp Amp (Prev) (%) Site1 Site2 Dist (cm) Jez (m/s) Norm Jez (m/s)   Left Median Motor (Abd Poll Brev)  23 °C   Wrist    4.3 <4.5 10.6 >4.1 100.0 Wrist Abd Poll Brev 8.0     Elbow    7.6  10.3  97.2 Elbow Wrist 17.5 53 >49   Right Median Motor (Abd Poll Brev)  31.8 °C   Wrist    4.1 <4.5 11.5 >4.1 100.0 Wrist Abd Poll Brev 8.0     Elbow    7.5  9.8  85.2 Elbow Wrist 18.0 53 >49   Left Ulnar Motor (Abd Dig Minimi)   Wrist    2.5 <3.1 8.5 >7.0 100.0 Wrist Abd Dig Minimi 8.0     B Elbow    5.2  6.5  76.5 B Elbow Wrist 18.0 67 >50   A Elbow    6.8  6.4  98.5 A Elbow B Elbow 10.0 63 >50   Right Ulnar Motor (Abd Dig Minimi)  31.5 °C   Wrist    3.0 <3.1 10.5 >7.0 100.0 Wrist Abd Dig Minimi 8.0     B Elbow    5.8  10.1  96.2 B Elbow Wrist 18.5 66 >50   A Elbow    7.5  10.2  101.0 A Elbow B Elbow 10.0 59 >50     Comparison Summary Table     Stim Site NR Peak (ms) P-T Amp (µV) Site1 Site2 Dist (cm) Delta-0 (ms)   Right Median/Ulnar Palm Comparison (Wrist)  30.9 °C   Median Palm    2.6 23.1 Median Palm Ulnar Palm 8.0 0.7   Ulnar Palm    1.9 22.3         F Wave Studies     NR F-Lat (ms) Lat Norm (ms) L-R F-Lat (ms) L-R Lat Norm   Left Ulnar (Mrkrs) (Abd Dig Min)  23.7 °C      23.63 <32 1.11 <2.5   Right Ulnar (Mrkrs) (Abd Dig Min)  31.5 °C      24.74 <32 1.11 <2.5       EMG     Side Muscle Nerve Root Ins Act Fibs Psw Recrt Duration Amp Poly Comment   Left Deltoid Axillary C5-6 Nml Nml Nml Nml Nml Nml Nml    Left Triceps Radial C6-7-8 Nml Nml Nml Nml Nml Nml Nml    Left Biceps Musculocut C5-6 Nml Nml Nml Nml Nml Nml Nml    Left 1stDorInt Ulnar C8-T1 Nml Nml Nml Nml Nml Nml Nml    Left Abd Poll Brev Median C8-T1 Nml Nml Nml Nml Nml Nml Nml    Left Lower Cerv Parasp Rami C7,T1 Nml Nml Nml Nml Nml Nml Nml    Left Mid Cerv Parasp Rami C5,6 Nml Nml Nml Nml Nml Nml Nml    Right Deltoid Axillary C5-6 Nml Nml Nml Nml Nml Nml Nml    Right Triceps Radial C6-7-8 Nml Nml Nml Nml Nml Nml Nml    Right Biceps Musculocut C5-6 Nml Nml Nml Nml Nml Nml Nml    Right 1stDorInt Ulnar C8-T1 Nml Nml Nml Nml Nml Nml Nml    Right Abd Poll Brev Median C8-T1 Nml Nml Nml Nml Nml Nml Nml    Right Lower Cerv Parasp Rami C7,T1 Nml Nml Nml Nml Nml Nml Nml    Right Mid Cerv Parasp Rami C5,6 Nml Nml Nml Nml Nml Nml Nml      Waveforms:

## 2023-05-17 RX ORDER — KETOROLAC TROMETHAMINE 10 MG/1
10 TABLET, FILM COATED ORAL EVERY 6 HOURS PRN
COMMUNITY
Start: 2022-07-05

## 2023-05-17 RX ORDER — BACLOFEN 10 MG/1
TABLET ORAL
COMMUNITY
Start: 2022-06-30

## 2023-05-17 RX ORDER — DIAZEPAM 5 MG/1
5 TABLET ORAL EVERY 8 HOURS PRN
COMMUNITY
Start: 2022-07-05

## 2023-05-17 RX ORDER — BUTALBITAL, ACETAMINOPHEN AND CAFFEINE 50; 325; 40 MG/1; MG/1; MG/1
1 TABLET ORAL 3 TIMES DAILY
COMMUNITY
Start: 2022-07-01

## 2023-05-17 RX ORDER — HYDROXYCHLOROQUINE SULFATE 200 MG/1
200 TABLET, FILM COATED ORAL DAILY
COMMUNITY
Start: 2022-06-30